# Patient Record
Sex: FEMALE | Race: WHITE | NOT HISPANIC OR LATINO | Employment: FULL TIME | URBAN - METROPOLITAN AREA
[De-identification: names, ages, dates, MRNs, and addresses within clinical notes are randomized per-mention and may not be internally consistent; named-entity substitution may affect disease eponyms.]

---

## 2021-03-26 DIAGNOSIS — Z23 ENCOUNTER FOR IMMUNIZATION: ICD-10-CM

## 2022-05-25 ENCOUNTER — APPOINTMENT (EMERGENCY)
Dept: RADIOLOGY | Facility: HOSPITAL | Age: 52
End: 2022-05-25
Payer: COMMERCIAL

## 2022-05-25 ENCOUNTER — APPOINTMENT (EMERGENCY)
Dept: NON INVASIVE DIAGNOSTICS | Facility: HOSPITAL | Age: 52
End: 2022-05-25
Payer: COMMERCIAL

## 2022-05-25 ENCOUNTER — HOSPITAL ENCOUNTER (EMERGENCY)
Facility: HOSPITAL | Age: 52
Discharge: HOME | End: 2022-05-25
Attending: EMERGENCY MEDICINE | Admitting: EMERGENCY MEDICINE
Payer: COMMERCIAL

## 2022-05-25 VITALS
TEMPERATURE: 98.5 F | SYSTOLIC BLOOD PRESSURE: 185 MMHG | RESPIRATION RATE: 17 BRPM | HEART RATE: 80 BPM | BODY MASS INDEX: 33.6 KG/M2 | WEIGHT: 201.94 LBS | OXYGEN SATURATION: 98 % | DIASTOLIC BLOOD PRESSURE: 89 MMHG

## 2022-05-25 DIAGNOSIS — M25.562 LEFT KNEE PAIN: Primary | ICD-10-CM

## 2022-05-25 PROCEDURE — 99282 EMERGENCY DEPT VISIT SF MDM: CPT

## 2022-05-25 PROCEDURE — 93971 EXTREMITY STUDY: CPT | Performed by: SURGERY

## 2022-05-25 PROCEDURE — 99284 EMERGENCY DEPT VISIT MOD MDM: CPT

## 2022-05-25 PROCEDURE — 93971 EXTREMITY STUDY: CPT

## 2022-05-25 PROCEDURE — 73564 X-RAY EXAM KNEE 4 OR MORE: CPT

## 2022-05-25 RX ORDER — BUPROPION HYDROCHLORIDE 300 MG/1
300 TABLET ORAL EVERY MORNING
COMMUNITY
Start: 2022-01-14 | End: 2023-01-14

## 2022-05-25 RX ORDER — ESCITALOPRAM OXALATE 20 MG/1
20 TABLET ORAL DAILY
COMMUNITY
Start: 2022-01-14 | End: 2023-01-14

## 2022-05-25 NOTE — ED PROVIDER NOTES
History  Chief Complaint   Patient presents with    Knee Pain     L knee pain x 1 month, reports lump behind knee this katarinag     54 YOF with PMH thyroid nodule, prediabetes presents with complaint of left knee pain x1 month  Pt reports she has been having pain behind her knee and today she woke up with a "lump" behind her knee  Prior to this she states that her knee would randomly give out which has been occurring for about 2 months  She also reports that she will experience a shooting pain in the back of her knee as sometimes radiates down to her calf when she is just sitting there  She also admits to sick pain with ambulation  Denies numbness, tingling, weakness, redness, warmth  Denies any injury or trauma to the area  Does not have a history of DVTs  Prior to Admission Medications   Prescriptions Last Dose Informant Patient Reported? Taking? buPROPion (WELLBUTRIN XL) 300 mg 24 hr tablet   Yes Yes   Sig: Take 300 mg by mouth every morning   escitalopram (LEXAPRO) 20 mg tablet   Yes Yes   Sig: Take 20 mg by mouth in the morning  Facility-Administered Medications: None       No past medical history on file  No past surgical history on file  No family history on file  I have reviewed and agree with the history as documented  No existing history information found  No existing history information found  Review of Systems   Constitutional: Negative for chills and fever  HENT: Negative for congestion, ear pain and sore throat  Eyes: Negative for pain and visual disturbance  Respiratory: Negative for cough and shortness of breath  Cardiovascular: Negative for chest pain and palpitations  Gastrointestinal: Negative for abdominal pain, diarrhea, nausea and vomiting  Genitourinary: Negative for decreased urine volume, dysuria and hematuria  Musculoskeletal: Positive for arthralgias (left kne)  Negative for back pain, neck pain and neck stiffness     Skin: Negative for color change and rash  Neurological: Negative for dizziness, seizures, syncope, weakness, light-headedness and headaches  All other systems reviewed and are negative  Physical Exam  Physical Exam  Vitals and nursing note reviewed  Constitutional:       General: She is not in acute distress  Appearance: She is well-developed  HENT:      Head: Normocephalic and atraumatic  Nose: Nose normal       Mouth/Throat:      Mouth: Mucous membranes are moist    Eyes:      Extraocular Movements: Extraocular movements intact  Conjunctiva/sclera: Conjunctivae normal    Cardiovascular:      Rate and Rhythm: Normal rate and regular rhythm  Heart sounds: No murmur heard  Pulmonary:      Effort: Pulmonary effort is normal  No respiratory distress  Breath sounds: Normal breath sounds  Abdominal:      General: Abdomen is flat  Palpations: Abdomen is soft  Tenderness: There is no abdominal tenderness  Musculoskeletal:         General: No swelling, deformity or signs of injury  Normal range of motion  Cervical back: Neck supple  Right knee: Normal       Left knee: Effusion present  No swelling, deformity, erythema, ecchymosis or lacerations  Normal range of motion  Tenderness present  Right lower leg: No swelling  No edema  Left lower leg: No swelling or tenderness  No edema  Comments: Possible effusion behind left knee  Not cyst-like  No redness or warmth  Slight pain to palpation   Skin:     General: Skin is warm and dry  Capillary Refill: Capillary refill takes less than 2 seconds  Neurological:      General: No focal deficit present  Mental Status: She is alert and oriented to person, place, and time     Psychiatric:         Mood and Affect: Mood normal          Behavior: Behavior normal          Vital Signs  ED Triage Vitals [05/25/22 0708]   Temperature Pulse Respirations Blood Pressure SpO2   98 5 °F (36 9 °C) 80 17 (!) 185/89 98 %      Temp Source Heart Rate Source Patient Position - Orthostatic VS BP Location FiO2 (%)   Oral Monitor Lying Left arm --      Pain Score       --           Vitals:    05/25/22 0708   BP: (!) 185/89   Pulse: 80   Patient Position - Orthostatic VS: Lying         Visual Acuity      ED Medications  Medications - No data to display    Diagnostic Studies  Results Reviewed     None                 XR knee 4+ views left injury   ED Interpretation by Zay Mcdowell PA-C (05/25 8592)   No acute bony findings, possible soft tissue swelling in posterior leg  Final Result by Marsha Thompson MD (60/68 2199)      No acute osseous abnormality  Workstation performed: [id number]         VAS lower limb venous duplex study, unilateral/limited    (Results Pending)              Procedures  Procedures         ED Course  ED Course as of 05/25/22 1121   Wed May 25, 2022   0841 XR knee 4+ views left injury  No acute osseous abnormality   0919 Negative for DVT in left lower extremity                               SBIRT 22yo+    Flowsheet Row Most Recent Value   SBIRT (25 yo +)    In order to provide better care to our patients, we are screening all of our patients for alcohol and drug use  Would it be okay to ask you these screening questions? Unable to answer at this time Filed at: 05/25/2022 0715                    [facility]  Number of Diagnoses or Management Options  Left knee pain  Diagnosis management comments: 54 YOF with PMH thyroid nodule, prediabetes presents with complaint of left knee pain x1 month  Physical exam as noted above  Xray showed no acute abnormalities  Venous duplex was also performed- negative for DVT  Suspect this is a developing cyst vs swelling of soft tissue  Recommend she use brace she has at home for compression and support as well as ice as needed  Referral placed for ortho  I have discussed with the patient our plan to discharge them from the ED and the patient is in agreement with this plan   The patient was provided a written after visit summary with strict RTED precautions  Amount and/or Complexity of Data Reviewed  Tests in the radiology section of CPT®: ordered and reviewed  Decide to obtain previous medical records or to obtain history from someone other than the patient: yes  Review and summarize past medical records: yes  Independent visualization of images, tracings, or specimens: yes    Patient Progress  Patient progress: stable      Disposition  Final diagnoses:   Left knee pain     Time reflects when diagnosis was documented in both MDM as applicable and the Disposition within this note     Time User Action Codes Description Comment    5/25/2022  8:26 AM Reynaldo Marin Add [C42 788] Left knee pain       ED Disposition     ED Disposition   Discharge    Condition   Stable    Date/Time   Wed May 25, 2022  9:20 AM    Comment   Elana Hope discharge to home/self care                 Follow-up Information     Follow up With Specialties Details Why Contact Info Additional Information    [address] [facility]  As needed [address] [facility] [address]  50 [facility] Emergency Department Emergency Medicine  If symptoms worsen [facility] [id number]  112 [facility] Emergency Department, [address] [place] [address] 200   As needed [phone]             Discharge Medication List as of 5/25/2022  9:20 AM      CONTINUE these medications which have NOT CHANGED    Details   buPROPion (WELLBUTRIN XL) 300 mg 24 hr tablet Take 300 mg by mouth every morning, Starting Fri 1/14/2022, Until Sat 1/14/2023, Historical Med      escitalopram (LEXAPRO) 20 mg tablet Take 20 mg by mouth in the morning , Starting Fri 1/14/2022, Until Sat 1/14/2023, Historical Med                 PDMP Review     None          ED Provider  Electronically Signed by           Sean Salas PA-C  05/25/22 7568

## 2022-08-19 ENCOUNTER — HOSPITAL ENCOUNTER (OUTPATIENT)
Facility: HOSPITAL | Age: 52
Setting detail: OBSERVATION
Discharge: HOME | End: 2022-08-21
Attending: EMERGENCY MEDICINE | Admitting: INTERNAL MEDICINE
Payer: COMMERCIAL

## 2022-08-19 DIAGNOSIS — K56.7 ILEUS (HCC): Primary | ICD-10-CM

## 2022-08-19 DIAGNOSIS — R10.84 GENERALIZED ABDOMINAL PAIN: ICD-10-CM

## 2022-08-19 LAB
BASOPHILS # BLD AUTO: 0.09 THOUSANDS/ΜL (ref 0–0.1)
BASOPHILS NFR BLD AUTO: 1 % (ref 0–1)
BILIRUB UR QL STRIP: NEGATIVE
CLARITY UR: CLEAR
CLARITY, POC: CLEAR
COLOR UR: YELLOW
COLOR, POC: NORMAL
EOSINOPHIL # BLD AUTO: 0.26 THOUSAND/ΜL (ref 0–0.61)
EOSINOPHIL NFR BLD AUTO: 2 % (ref 0–6)
ERYTHROCYTE [DISTWIDTH] IN BLOOD BY AUTOMATED COUNT: 12.9 % (ref 11.6–15.1)
EXT BILIRUBIN, UA: NEGATIVE
EXT BLOOD URINE: NEGATIVE
EXT GLUCOSE, UA: NEGATIVE
EXT KETONES: NEGATIVE
EXT NITRITE, UA: NEGATIVE
EXT PH, UA: 6
EXT PREG TEST URINE: NEGATIVE
EXT PROTEIN, UA: NEGATIVE
EXT SPECIFIC GRAVITY, UA: 1.02
EXT. CONTROL ED NAV: NORMAL
GLUCOSE UR STRIP-MCNC: NEGATIVE MG/DL
HCT VFR BLD AUTO: 44.5 % (ref 34.8–46.1)
HGB BLD-MCNC: 15.5 G/DL (ref 11.5–15.4)
HGB UR QL STRIP.AUTO: NEGATIVE
IMM GRANULOCYTES # BLD AUTO: 0.08 THOUSAND/UL (ref 0–0.2)
IMM GRANULOCYTES NFR BLD AUTO: 1 % (ref 0–2)
KETONES UR STRIP-MCNC: NEGATIVE MG/DL
LEUKOCYTE ESTERASE UR QL STRIP: NEGATIVE
LYMPHOCYTES # BLD AUTO: 3.5 THOUSANDS/ΜL (ref 0.6–4.47)
LYMPHOCYTES NFR BLD AUTO: 32 % (ref 14–44)
MCH RBC QN AUTO: 29.2 PG (ref 26.8–34.3)
MCHC RBC AUTO-ENTMCNC: 34.8 G/DL (ref 31.4–37.4)
MCV RBC AUTO: 84 FL (ref 82–98)
MONOCYTES # BLD AUTO: 0.77 THOUSAND/ΜL (ref 0.17–1.22)
MONOCYTES NFR BLD AUTO: 7 % (ref 4–12)
NEUTROPHILS # BLD AUTO: 6.38 THOUSANDS/ΜL (ref 1.85–7.62)
NEUTS SEG NFR BLD AUTO: 57 % (ref 43–75)
NITRITE UR QL STRIP: NEGATIVE
NRBC BLD AUTO-RTO: 0 /100 WBCS
PH UR STRIP.AUTO: 6 [PH] (ref 4.5–8)
PLATELET # BLD AUTO: 255 THOUSANDS/UL (ref 149–390)
PMV BLD AUTO: 9.6 FL (ref 8.9–12.7)
PROT UR STRIP-MCNC: NEGATIVE MG/DL
RBC # BLD AUTO: 5.3 MILLION/UL (ref 3.81–5.12)
SP GR UR STRIP.AUTO: 1.02 (ref 1–1.03)
UROBILINOGEN UR QL STRIP.AUTO: 0.2 E.U./DL
WBC # BLD AUTO: 11.08 THOUSAND/UL (ref 4.31–10.16)
WBC # BLD EST: NEGATIVE 10*3/UL

## 2022-08-19 PROCEDURE — 81025 URINE PREGNANCY TEST: CPT | Performed by: EMERGENCY MEDICINE

## 2022-08-19 PROCEDURE — 85610 PROTHROMBIN TIME: CPT | Performed by: EMERGENCY MEDICINE

## 2022-08-19 PROCEDURE — 85730 THROMBOPLASTIN TIME PARTIAL: CPT | Performed by: EMERGENCY MEDICINE

## 2022-08-19 PROCEDURE — 96375 TX/PRO/DX INJ NEW DRUG ADDON: CPT

## 2022-08-19 PROCEDURE — 81003 URINALYSIS AUTO W/O SCOPE: CPT

## 2022-08-19 PROCEDURE — 85025 COMPLETE CBC W/AUTO DIFF WBC: CPT | Performed by: EMERGENCY MEDICINE

## 2022-08-19 PROCEDURE — 83690 ASSAY OF LIPASE: CPT | Performed by: EMERGENCY MEDICINE

## 2022-08-19 PROCEDURE — 99285 EMERGENCY DEPT VISIT HI MDM: CPT

## 2022-08-19 PROCEDURE — 99285 EMERGENCY DEPT VISIT HI MDM: CPT | Performed by: EMERGENCY MEDICINE

## 2022-08-19 PROCEDURE — 80053 COMPREHEN METABOLIC PANEL: CPT | Performed by: EMERGENCY MEDICINE

## 2022-08-19 PROCEDURE — 96361 HYDRATE IV INFUSION ADD-ON: CPT

## 2022-08-19 PROCEDURE — 96374 THER/PROPH/DIAG INJ IV PUSH: CPT

## 2022-08-19 PROCEDURE — 36415 COLL VENOUS BLD VENIPUNCTURE: CPT | Performed by: EMERGENCY MEDICINE

## 2022-08-19 RX ORDER — ONDANSETRON 2 MG/ML
4 INJECTION INTRAMUSCULAR; INTRAVENOUS ONCE
Status: COMPLETED | OUTPATIENT
Start: 2022-08-19 | End: 2022-08-19

## 2022-08-19 RX ORDER — MORPHINE SULFATE 4 MG/ML
6 INJECTION, SOLUTION INTRAMUSCULAR; INTRAVENOUS ONCE
Status: COMPLETED | OUTPATIENT
Start: 2022-08-19 | End: 2022-08-19

## 2022-08-19 RX ADMIN — SODIUM CHLORIDE 1000 ML: 0.9 INJECTION, SOLUTION INTRAVENOUS at 23:49

## 2022-08-19 RX ADMIN — ONDANSETRON 4 MG: 2 INJECTION INTRAMUSCULAR; INTRAVENOUS at 23:49

## 2022-08-19 RX ADMIN — MORPHINE SULFATE 6 MG: 4 INJECTION INTRAVENOUS at 23:58

## 2022-08-20 ENCOUNTER — APPOINTMENT (EMERGENCY)
Dept: CT IMAGING | Facility: HOSPITAL | Age: 52
End: 2022-08-20
Payer: COMMERCIAL

## 2022-08-20 PROBLEM — E86.0 DEHYDRATION: Status: ACTIVE | Noted: 2022-08-20

## 2022-08-20 PROBLEM — F34.1 DYSTHYMIA: Status: ACTIVE | Noted: 2022-08-20

## 2022-08-20 PROBLEM — K56.7 ILEUS (HCC): Status: ACTIVE | Noted: 2022-08-20

## 2022-08-20 LAB
ALBUMIN SERPL BCP-MCNC: 3.9 G/DL (ref 3.5–5)
ALP SERPL-CCNC: 60 U/L (ref 46–116)
ALT SERPL W P-5'-P-CCNC: 64 U/L (ref 12–78)
ANION GAP SERPL CALCULATED.3IONS-SCNC: 9 MMOL/L (ref 4–13)
APTT PPP: 22 SECONDS (ref 23–37)
AST SERPL W P-5'-P-CCNC: 30 U/L (ref 5–45)
BILIRUB SERPL-MCNC: 0.4 MG/DL (ref 0.2–1)
BUN SERPL-MCNC: 13 MG/DL (ref 5–25)
CALCIUM SERPL-MCNC: 9.2 MG/DL (ref 8.3–10.1)
CHLORIDE SERPL-SCNC: 103 MMOL/L (ref 96–108)
CO2 SERPL-SCNC: 27 MMOL/L (ref 21–32)
CREAT SERPL-MCNC: 1.14 MG/DL (ref 0.6–1.3)
GFR SERPL CREATININE-BSD FRML MDRD: 56 ML/MIN/1.73SQ M
GLUCOSE SERPL-MCNC: 118 MG/DL (ref 65–140)
INR PPP: 0.95 (ref 0.84–1.19)
LACTATE SERPL-SCNC: 0.9 MMOL/L (ref 0.5–2)
LIPASE SERPL-CCNC: 112 U/L (ref 73–393)
POTASSIUM SERPL-SCNC: 3.8 MMOL/L (ref 3.5–5.3)
PROT SERPL-MCNC: 8.4 G/DL (ref 6.4–8.4)
PROTHROMBIN TIME: 12.7 SECONDS (ref 11.6–14.5)
SODIUM SERPL-SCNC: 139 MMOL/L (ref 135–147)

## 2022-08-20 PROCEDURE — 74176 CT ABD & PELVIS W/O CONTRAST: CPT

## 2022-08-20 PROCEDURE — 96376 TX/PRO/DX INJ SAME DRUG ADON: CPT

## 2022-08-20 PROCEDURE — G1004 CDSM NDSC: HCPCS

## 2022-08-20 PROCEDURE — 36415 COLL VENOUS BLD VENIPUNCTURE: CPT | Performed by: EMERGENCY MEDICINE

## 2022-08-20 PROCEDURE — 83605 ASSAY OF LACTIC ACID: CPT | Performed by: EMERGENCY MEDICINE

## 2022-08-20 PROCEDURE — 99220 PR INITIAL OBSERVATION CARE/DAY 70 MINUTES: CPT | Performed by: INTERNAL MEDICINE

## 2022-08-20 PROCEDURE — 74177 CT ABD & PELVIS W/CONTRAST: CPT

## 2022-08-20 PROCEDURE — 99204 OFFICE O/P NEW MOD 45 MIN: CPT | Performed by: SURGERY

## 2022-08-20 RX ORDER — MORPHINE SULFATE 4 MG/ML
4 INJECTION, SOLUTION INTRAMUSCULAR; INTRAVENOUS ONCE
Status: COMPLETED | OUTPATIENT
Start: 2022-08-20 | End: 2022-08-20

## 2022-08-20 RX ORDER — ACETAMINOPHEN 325 MG/1
650 TABLET ORAL EVERY 4 HOURS PRN
Status: DISCONTINUED | OUTPATIENT
Start: 2022-08-20 | End: 2022-08-21 | Stop reason: HOSPADM

## 2022-08-20 RX ORDER — ESCITALOPRAM OXALATE 20 MG/1
20 TABLET ORAL DAILY
Status: DISCONTINUED | OUTPATIENT
Start: 2022-08-20 | End: 2022-08-21 | Stop reason: HOSPADM

## 2022-08-20 RX ORDER — BUPROPION HYDROCHLORIDE 150 MG/1
300 TABLET ORAL EVERY MORNING
Status: DISCONTINUED | OUTPATIENT
Start: 2022-08-20 | End: 2022-08-21 | Stop reason: HOSPADM

## 2022-08-20 RX ORDER — ONDANSETRON 2 MG/ML
4 INJECTION INTRAMUSCULAR; INTRAVENOUS EVERY 4 HOURS PRN
Status: DISCONTINUED | OUTPATIENT
Start: 2022-08-20 | End: 2022-08-21 | Stop reason: HOSPADM

## 2022-08-20 RX ORDER — ONDANSETRON 2 MG/ML
4 INJECTION INTRAMUSCULAR; INTRAVENOUS ONCE
Status: COMPLETED | OUTPATIENT
Start: 2022-08-20 | End: 2022-08-20

## 2022-08-20 RX ORDER — SODIUM CHLORIDE AND POTASSIUM CHLORIDE .9; .15 G/100ML; G/100ML
125 SOLUTION INTRAVENOUS CONTINUOUS
Status: DISCONTINUED | OUTPATIENT
Start: 2022-08-20 | End: 2022-08-21

## 2022-08-20 RX ADMIN — ONDANSETRON 4 MG: 2 INJECTION INTRAMUSCULAR; INTRAVENOUS at 05:25

## 2022-08-20 RX ADMIN — ACETAMINOPHEN 650 MG: 325 TABLET, FILM COATED ORAL at 16:07

## 2022-08-20 RX ADMIN — IOHEXOL 60 ML: 350 INJECTION, SOLUTION INTRAVENOUS at 00:38

## 2022-08-20 RX ADMIN — ESCITALOPRAM OXALATE 20 MG: 20 TABLET, FILM COATED ORAL at 09:36

## 2022-08-20 RX ADMIN — BUPROPION HYDROCHLORIDE 300 MG: 150 TABLET, FILM COATED, EXTENDED RELEASE ORAL at 09:36

## 2022-08-20 RX ADMIN — SODIUM CHLORIDE AND POTASSIUM CHLORIDE 125 ML/HR: .9; .15 SOLUTION INTRAVENOUS at 20:55

## 2022-08-20 RX ADMIN — MORPHINE SULFATE 4 MG: 4 INJECTION INTRAVENOUS at 05:25

## 2022-08-20 RX ADMIN — DIATRIZOATE MEGLUMINE AND DIATRIZOATE SODIUM 30 ML: 660; 100 LIQUID ORAL; RECTAL at 05:58

## 2022-08-20 RX ADMIN — ACETAMINOPHEN 650 MG: 325 TABLET, FILM COATED ORAL at 09:36

## 2022-08-20 RX ADMIN — SODIUM CHLORIDE AND POTASSIUM CHLORIDE 125 ML/HR: .9; .15 SOLUTION INTRAVENOUS at 11:04

## 2022-08-20 NOTE — ED PROVIDER NOTES
History  Chief Complaint   Patient presents with    Abdominal Pain     Pt c/o abd pain since 530pm, reports pain has been getting worse, also reports nausea  This is a 57-year-old female who presents with abdominal pain  At around 5:30 p m  This evening, patient started to experience a generalized abdominal pain described as cramping, constant, waxing and waning in intensity, associated with 1 episode of nonbloody, nonbilious vomiting  She has never experienced this type of pain before  Denies any alleviating or exacerbating factors  She has not taken anything for the pain  Her last bowel movement was yesterday described as normal   Denies any history of abdominal surgeries  No history of gallstones, kidney stones, alcohol abuse  Denies fever/chills, lightheadedness/dizziness, numbness/weakness, headache, change in vision, URI symptoms, neck pain, chest pain, palpitations, shortness of breath, cough, back pain, flank pain, diarrhea, hematochezia, melena, dysuria, hematuria, abnormal vaginal discharge/bleeding  Prior to Admission Medications   Prescriptions Last Dose Informant Patient Reported? Taking? buPROPion (WELLBUTRIN XL) 300 mg 24 hr tablet 8/19/2022 at Unknown time  Yes Yes   Sig: Take 300 mg by mouth every morning   escitalopram (LEXAPRO) 20 mg tablet 8/19/2022 at Unknown time  Yes Yes   Sig: Take 20 mg by mouth in the morning  Facility-Administered Medications: None       History reviewed  No pertinent past medical history  History reviewed  No pertinent surgical history  History reviewed  No pertinent family history  I have reviewed and agree with the history as documented      E-Cigarette/Vaping     E-Cigarette/Vaping Substances     Social History     Tobacco Use    Smoking status: Current Every Day Smoker     Packs/day: 0 50     Types: Cigarettes     Start date: 14    Smokeless tobacco: Never Used   Substance Use Topics    Alcohol use: Yes     Comment: milvia    Drug use: Never       Review of Systems   Constitutional: Negative for chills and fever  HENT: Negative for rhinorrhea, sore throat and trouble swallowing  Eyes: Negative for photophobia and visual disturbance  Respiratory: Negative for cough, chest tightness and shortness of breath  Cardiovascular: Negative for chest pain, palpitations and leg swelling  Gastrointestinal: Positive for abdominal pain, nausea and vomiting  Negative for blood in stool and diarrhea  Endocrine: Negative for polyuria  Genitourinary: Negative for dysuria, flank pain, hematuria, vaginal bleeding and vaginal discharge  Musculoskeletal: Negative for back pain and neck pain  Skin: Negative for color change and rash  Allergic/Immunologic: Negative for immunocompromised state  Neurological: Negative for dizziness, weakness, light-headedness, numbness and headaches  All other systems reviewed and are negative  Physical Exam  Physical Exam  Constitutional:       General: She is not in acute distress  Appearance: Normal appearance  She is well-developed  HENT:      Mouth/Throat:      Pharynx: Uvula midline  Eyes:      Conjunctiva/sclera: Conjunctivae normal       Pupils: Pupils are equal, round, and reactive to light  Neck:      Thyroid: No thyroid mass or thyromegaly  Trachea: Trachea normal    Cardiovascular:      Rate and Rhythm: Normal rate and regular rhythm  Heart sounds: Normal heart sounds  No murmur heard  Pulmonary:      Effort: Pulmonary effort is normal       Breath sounds: Normal breath sounds  Abdominal:      General: Bowel sounds are normal       Palpations: Abdomen is soft  Tenderness: There is abdominal tenderness in the right upper quadrant, right lower quadrant and epigastric area  There is no guarding or rebound  Skin:     General: Skin is warm and dry  Neurological:      Mental Status: She is alert     Psychiatric:         Speech: Speech normal          Behavior: Behavior normal  Behavior is cooperative  Thought Content:  Thought content normal          Vital Signs  ED Triage Vitals   Temperature Pulse Respirations Blood Pressure SpO2   08/19/22 2313 08/19/22 2313 08/19/22 2313 08/19/22 2313 08/19/22 2313   97 8 °F (36 6 °C) 76 16 (!) 181/79 99 %      Temp Source Heart Rate Source Patient Position - Orthostatic VS BP Location FiO2 (%)   08/19/22 2313 08/19/22 2313 08/19/22 2313 08/19/22 2313 --   Oral Monitor Sitting Right arm       Pain Score       08/19/22 2315       10 - Worst Possible Pain           Vitals:    08/20/22 1515 08/20/22 2318 08/21/22 0625 08/21/22 1411   BP: 131/79 132/81 127/83 132/78   Pulse: 68 73 67 69   Patient Position - Orthostatic VS:  Lying Lying          Visual Acuity      ED Medications  Medications   sodium chloride 0 9 % bolus 1,000 mL (0 mL Intravenous Stopped 8/20/22 0054)   ondansetron (ZOFRAN) injection 4 mg (4 mg Intravenous Given 8/19/22 2349)   morphine injection 6 mg (6 mg Intravenous Given 8/19/22 2358)   iohexol (OMNIPAQUE) 350 MG/ML injection (SINGLE-DOSE) 60 mL (60 mL Intravenous Given 8/20/22 0038)   ondansetron (ZOFRAN) injection 4 mg (4 mg Intravenous Given 8/20/22 0525)   morphine injection 4 mg (4 mg Intravenous Given 8/20/22 0525)   diatrizoate meglumine-sodium (GASTROGRAFIN) solution 30 mL (30 mL Oral Given 8/20/22 0558)       Diagnostic Studies  Results Reviewed     Procedure Component Value Units Date/Time    Basic metabolic panel [[id number]] Collected: 08/21/22 0447    Lab Status: Final result Specimen: Blood from Arm, Right Updated: 08/21/22 0515     Sodium 141 mmol/L      Potassium 4 3 mmol/L      Chloride 108 mmol/L      CO2 28 mmol/L      ANION GAP 5 mmol/L      BUN 8 mg/dL      Creatinine 1 07 mg/dL      Glucose 99 mg/dL      Calcium 8 4 mg/dL      eGFR 61 ml/min/1 73sq m     Narrative:      [place] guidelines for Chronic Kidney Disease (CKD):     Stage 1 with normal or high GFR (GFR > 90 mL/min/1 73 square meters)    Stage 2 Mild CKD (GFR = 60-89 mL/min/1 73 square meters)    Stage 3A Moderate CKD (GFR = 45-59 mL/min/1 73 square meters)    Stage 3B Moderate CKD (GFR = 30-44 mL/min/1 73 square meters)    Stage 4 Severe CKD (GFR = 15-29 mL/min/1 73 square meters)    Stage 5 End Stage CKD (GFR <15 mL/min/1 73 square meters)  Note: GFR calculation is accurate only with a steady state creatinine    CBC and differential [[id number]] Collected: 08/21/22 0448    Lab Status: Final result Specimen: Blood from Arm, Right Updated: 08/21/22 0507     WBC 6 05 Thousand/uL      RBC 4 75 Million/uL      Hemoglobin 13 7 g/dL      Hematocrit 40 9 %      MCV 86 fL      MCH 28 8 pg      MCHC 33 5 g/dL      RDW 12 9 %      MPV 9 7 fL      Platelets 465 Thousands/uL      nRBC 0 /100 WBCs      Neutrophils Relative 49 %      Immat GRANS % 0 %      Lymphocytes Relative 40 %      Monocytes Relative 7 %      Eosinophils Relative 3 %      Basophils Relative 1 %      Neutrophils Absolute 2 96 Thousands/µL      Immature Grans Absolute 0 02 Thousand/uL      Lymphocytes Absolute 2 40 Thousands/µL      Monocytes Absolute 0 44 Thousand/µL      Eosinophils Absolute 0 19 Thousand/µL      Basophils Absolute 0 04 Thousands/µL     POCT urinalysis dipstick [[id number]]  (Normal) Resulted: 08/19/22 7533    Lab Status: Final result Specimen: Urine Updated: 08/20/22 0645     Color, UA yellow/straw     Clarity, UA clear     Glucose, UA (Ref: Negative) negative     Bilirubin, UA (Ref: Negative) negative     Ketones, UA (Ref: Negative) negative     Spec Grav, UA (Ref:1 003-1 030) 1 020     Blood, UA (Ref: Negative) negative     pH, UA (Ref: 4 5-8 0) 6 0     Protein, UA (Ref: Negative) negative      Leukocytes, UA (Ref: Negative) negative     Nitrite, UA (Ref: Negative) negative    Lactic acid [[id number]]  (Normal) Collected: 08/20/22 0313    Lab Status: Final result Specimen: Blood from Arm, Left Updated: 08/20/22 0338     LACTIC ACID 0 9 mmol/L     Narrative:      Result may be elevated if tourniquet was used during collection      Comprehensive metabolic panel [[id number]] Collected: 08/19/22 2349    Lab Status: Final result Specimen: Blood from Arm, Left Updated: 08/20/22 0016     Sodium 139 mmol/L      Potassium 3 8 mmol/L      Chloride 103 mmol/L      CO2 27 mmol/L      ANION GAP 9 mmol/L      BUN 13 mg/dL      Creatinine 1 14 mg/dL      Glucose 118 mg/dL      Calcium 9 2 mg/dL      AST 30 U/L      ALT 64 U/L      Alkaline Phosphatase 60 U/L      Total Protein 8 4 g/dL      Albumin 3 9 g/dL      Total Bilirubin 0 40 mg/dL      eGFR 56 ml/min/1 73sq m     Narrative:      [place] guidelines for Chronic Kidney Disease (CKD):     Stage 1 with normal or high GFR (GFR > 90 mL/min/1 73 square meters)    Stage 2 Mild CKD (GFR = 60-89 mL/min/1 73 square meters)    Stage 3A Moderate CKD (GFR = 45-59 mL/min/1 73 square meters)    Stage 3B Moderate CKD (GFR = 30-44 mL/min/1 73 square meters)    Stage 4 Severe CKD (GFR = 15-29 mL/min/1 73 square meters)    Stage 5 End Stage CKD (GFR <15 mL/min/1 73 square meters)  Note: GFR calculation is accurate only with a steady state creatinine    Lipase [[id number]]  (Normal) Collected: 08/19/22 2349    Lab Status: Final result Specimen: Blood from Arm, Left Updated: 08/20/22 0016     Lipase 112 u/L     Protime-INR [[id number]]  (Normal) Collected: 08/19/22 2349    Lab Status: Final result Specimen: Blood from Arm, Left Updated: 08/20/22 0006     Protime 12 7 seconds      INR 0 95    APTT [[id number]]  (Abnormal) Collected: 08/19/22 2349    Lab Status: Final result Specimen: Blood from Arm, Left Updated: 08/20/22 0006     PTT 22 seconds     CBC and differential [[id number]]  (Abnormal) Collected: 08/19/22 2349    Lab Status: Final result Specimen: Blood from Arm, Left Updated: 08/19/22 2359     WBC 11 08 Thousand/uL      RBC 5 30 Million/uL      Hemoglobin 15 5 g/dL      Hematocrit 44 5 % MCV 84 fL      MCH 29 2 pg      MCHC 34 8 g/dL      RDW 12 9 %      MPV 9 6 fL      Platelets 581 Thousands/uL      nRBC 0 /100 WBCs      Neutrophils Relative 57 %      Immat GRANS % 1 %      Lymphocytes Relative 32 %      Monocytes Relative 7 %      Eosinophils Relative 2 %      Basophils Relative 1 %      Neutrophils Absolute 6 38 Thousands/µL      Immature Grans Absolute 0 08 Thousand/uL      Lymphocytes Absolute 3 50 Thousands/µL      Monocytes Absolute 0 77 Thousand/µL      Eosinophils Absolute 0 26 Thousand/µL      Basophils Absolute 0 09 Thousands/µL     POCT pregnancy, urine [[id number]]  (Normal) Resulted: 08/19/22 2352    Lab Status: Final result Updated: 08/19/22 2352     EXT PREG TEST UR (Ref: Negative) NEGATIVE     Control VALID    Urine Macroscopic, POC [[id number]] Collected: 08/19/22 2347    Lab Status: Final result Specimen: Urine Updated: 08/19/22 2348     Color, UA Yellow     Clarity, UA Clear     pH, UA 6 0     Leukocytes, UA Negative     Nitrite, UA Negative     Protein, UA Negative mg/dl      Glucose, UA Negative mg/dl      Ketones, UA Negative mg/dl      Urobilinogen, UA 0 2 E U /dl      Bilirubin, UA Negative     Occult Blood, UA Negative     Specific Gravity, UA 1 020    Narrative:      CLINITEK RESULT                 CT abdomen pelvis wo contrast   Final Result by Kayley Trent MD (08/20 0636)      Fairly long segment of mildly but disproportionately distended jejunum in the left mid abdomen is of uncertain etiology  Even though enteric contrast has not passed into the more collapsed distal small bowel, in this patient with no history of prior    abdominal surgery the appearance remains more suspicious for localized ileus than for small bowel obstruction  If appropriate to the clinical scenario, continued interval radiographic follow-up to observe the progression of ingested enteric contrast    would likely prove useful  Hepatic steatosis              Workstation performed: [id number]         CT abdomen pelvis with contrast   Final Result by Cristina Sharpe MD (08/20 0234)      Multiple dilated fluid-filled small bowel loops with transition point in the left upper quadrant compatible with small bowel obstruction  The study was marked in [facility] for immediate notification  Workstation performed: [id number]                    Procedures  Procedures         ED Course  ED Course as of 08/23/22 2059   Sat Aug 20, 2022   0258 TT sent to surgery resident at [id number] Surgery resident has seen the patient  Requesting a repeat CT with oral contrast                                              MDM  Number of Diagnoses or Management Options  Diagnosis management comments: Will check labs, urinalysis  CT abdomen/pelvis with contrast   IV fluids and analgesia  Disposition pending results  Disposition  Final diagnoses:   Ileus ([address] )   Generalized abdominal pain     Time reflects when diagnosis was documented in both MDM as applicable and the Disposition within this note     Time User Action Codes Description Comment    8/20/2022  2:45 AM Dave Tapia Add [K56 [id number]] SBO (small bowel obstruction) ([facility] [address] )     8/20/2022  8:25 AM Rudell Senna A Remove [K56 609] SBO (small bowel obstruction) ([address] )     8/20/2022  8:25 AM Rudell Senna A Add [K56 7] Ileus ([address] )     8/20/2022  8:26 AM Rudell Senna A Add [R10 84] Generalized abdominal pain       ED Disposition     ED Disposition   Admit    Condition   Good    Date/Time   Sat Aug 20, 2022  8:26 AM    Comment   Case was discussed with dr barrios and the patient's admission status was agreed to be Admission Status: observation status to the service of Dr Jose Luis Horan              Follow-up Information     Follow up With Specialties Details Why Contact Info    [facility] Schedule an appointment as soon as possible for a visit in 1 week(s)  [address] [facility]  [phone]            Discharge Medication List as of 8/21/2022  3:42 PM      START taking these medications    Details   ondansetron (Zofran ODT) 4 mg disintegrating tablet Take 1 tablet (4 mg total) by mouth every 6 (six) hours as needed for nausea or vomiting, Starting Sun 8/21/2022, Normal         CONTINUE these medications which have NOT CHANGED    Details   buPROPion (WELLBUTRIN XL) 300 mg 24 hr tablet Take 300 mg by mouth every morning, Starting Fri 1/14/2022, Until Sat 1/14/2023, Historical Med      escitalopram (LEXAPRO) 20 mg tablet Take 20 mg by mouth in the morning , Starting Fri 1/14/2022, Until Sat 1/14/2023, Historical Med             Outpatient Discharge Orders   Discharge Diet     Activity as tolerated       PDMP Review     None          ED Provider  Electronically Signed by           Luis Javier MD  08/23/22 2059

## 2022-08-20 NOTE — ED NOTES
Patient rang out reporting nausea and abdominal pain had returned after drinking contrast dye   MD made aware     Matthew Braswell RN  08/20/22 8148

## 2022-08-20 NOTE — H&P
Unit/Bed#: E5 -01 Encounter: [id number]    Chief complaint, abdominal pain and vomiting    History of Present Illness     HPI:  Gordan Barthel is a 54 y o  female who was in her usual state of good health until the evening before admission  At that time she developed some crampy abdominal pain  She developed vomiting  She had no fever  She had no diarrhea  She had no sign of GI hemorrhage  She was seen in the emergency room and a CT scan was done  This revealed the possibility of small-bowel obstruction  She was evaluated by the surgery house staff who a requested additional imaging  This suggested ileus rather than small bowel obstruction  Dr Melissa Tejada diagnosis as a medical problem and suggested that the emergency room staff contact Internal Medicine to arrange admission  At the time of my evaluation the patient was feeling somewhat better  She was still nauseous and not able to tolerate more than little fluid  She is admitted for further care  The patient's past medical history is positive for depression which is under good control  She has no history of sustained hypertension no her blood pressure was somewhat elevated at the time of her last outpatient visit  She says that her sugar has been borderline but she has no history of overt diabetes  She denies hypercholesterolemia, cardiac disease, COPD, peptic ulcer disease, kidney disease, etcetera  The patient has had no surgery  The patient's current medications include bupropion 300 mg daily and escitalopram 20 mg daily    The patient has a distant history of penicillin allergy  It sounds like this was GI side effects rather than a true allergy  Family history is noncontributory  Social history reveals the patient is  lives with her   She smokes 1/2 pack of cigarettes per day  She drinks ethanol sparingly  She uses no illicit drugs      Review of systems is taken in detail and is unrevealing except as mentioned above  Objective   Vitals: Blood pressure 131/79, pulse 68, temperature 97 8 °F (36 6 °C), temperature source Oral, resp  rate 16, weight 93 3 kg (205 lb 11 oz), last menstrual period 05/17/2022, SpO2 96 %  Physical Exam   The patient is a well-developed, well-nourished woman who appears in no acute distress  Head is atraumatic and normocephalic  ENT examination is normal   Eyes show the pupils to be equal, round, and reactive to light  Extraocular movements are intact  Neck is supple  Carotids are full without bruits  There is no lymphadenopathy or goiter  Lungs are clear to auscultation and percussion  There is no wheezing, rales, or rhonchi  Cardiac exam reveals a regular rhythm  I heard no murmur, gallop, or rub  The abdomen is soft  Bowel sounds are hypoactive  There is mild diffuse tenderness  There is no mass or rebound  The extremities showed no clubbing, cyanosis, or edema  No calf tenderness is present  Neurologic examination reveals the patient to be alert and oriented  No focal sign is noted      Lab Results:   Results for orders placed or performed during the hospital encounter of 08/19/22   CBC and differential   Result Value Ref Range    WBC 11 08 (H) 4 31 - 10 16 Thousand/uL    RBC 5 30 (H) 3 81 - 5 12 Million/uL    Hemoglobin 15 5 (H) 11 5 - 15 4 g/dL    Hematocrit 44 5 34 8 - 46 1 %    MCV 84 82 - 98 fL    MCH 29 2 26 8 - 34 3 pg    MCHC 34 8 31 4 - 37 4 g/dL    RDW 12 9 11 6 - 15 1 %    MPV 9 6 8 9 - 12 7 fL    Platelets 763 542 - 709 Thousands/uL    nRBC 0 /100 WBCs    Neutrophils Relative 57 43 - 75 %    Immat GRANS % 1 0 - 2 %    Lymphocytes Relative 32 14 - 44 %    Monocytes Relative 7 4 - 12 %    Eosinophils Relative 2 0 - 6 %    Basophils Relative 1 0 - 1 %    Neutrophils Absolute 6 38 1 85 - 7 62 Thousands/µL    Immature Grans Absolute 0 08 0 00 - 0 20 Thousand/uL    Lymphocytes Absolute 3 50 0 60 - 4 47 Thousands/µL    Monocytes Absolute 0 77 0 17 - 1 22 Thousand/µL    Eosinophils Absolute 0 26 0 00 - 0 61 Thousand/µL    Basophils Absolute 0 09 0 00 - 0 10 Thousands/µL   Protime-INR   Result Value Ref Range    Protime 12 7 11 6 - 14 5 seconds    INR 0 95 0 84 - 1 19   APTT   Result Value Ref Range    PTT 22 (L) 23 - 37 seconds   Comprehensive metabolic panel   Result Value Ref Range    Sodium 139 135 - 147 mmol/L    Potassium 3 8 3 5 - 5 3 mmol/L    Chloride 103 96 - 108 mmol/L    CO2 27 21 - 32 mmol/L    ANION GAP 9 4 - 13 mmol/L    BUN 13 5 - 25 mg/dL    Creatinine 1 14 0 60 - 1 30 mg/dL    Glucose 118 65 - 140 mg/dL    Calcium 9 2 8 3 - 10 1 mg/dL    AST 30 5 - 45 U/L    ALT 64 12 - 78 U/L    Alkaline Phosphatase 60 46 - 116 U/L    Total Protein 8 4 6 4 - 8 4 g/dL    Albumin 3 9 3 5 - 5 0 g/dL    Total Bilirubin 0 40 0 20 - 1 00 mg/dL    eGFR 56 ml/min/1 73sq m   Lipase   Result Value Ref Range    Lipase 112 73 - 393 u/L   Lactic acid   Result Value Ref Range    LACTIC ACID 0 9 0 5 - 2 0 mmol/L   POCT pregnancy, urine   Result Value Ref Range    EXT PREG TEST UR (Ref: Negative) NEGATIVE     Control VALID    POCT urinalysis dipstick   Result Value Ref Range    Color, UA yellow/straw     Clarity, UA clear     Glucose, UA (Ref: Negative) negative     Bilirubin, UA (Ref: Negative) negative     Ketones, UA (Ref: Negative) negative     Spec Grav, UA (Ref:1 003-1 030) 1 020     Blood, UA (Ref: Negative) negative     pH, UA (Ref: 4 5-8 0) 6 0     Protein, UA (Ref: Negative) negative      Leukocytes, UA (Ref: Negative) negative     Nitrite, UA (Ref: Negative) negative    Urine Macroscopic, POC   Result Value Ref Range    Color, UA Yellow     Clarity, UA Clear     pH, UA 6 0 4 5 - 8 0    Leukocytes, UA Negative Negative    Nitrite, UA Negative Negative    Protein, UA Negative Negative mg/dl    Glucose, UA Negative Negative mg/dl    Ketones, UA Negative Negative mg/dl    Urobilinogen, UA 0 2 0 2, 1 0 E U /dl E U /dl    Bilirubin, UA Negative Negative    Occult Blood, UA Negative Negative    Specific Gravity, UA 1 020 1 003 - 1 030               Assessment/Plan     Assessment:  1  Abdominal pain and vomiting, probable ileus  2  Dehydration  3  Depression     Plan:  The patient will be placed on observation  She will be vigorously hydrated with intravenous fluid  She will be treated with antiemetics and analgesics  I believe that the patient's symptoms are most likely related to an underlying viral illness or food poisoning  I suspect that she will recover with supportive care  I discussed resuscitative measures with the patient she would like all available measures employed on her behalf in the event of a cardiac arrest   She is a sign to code blue level 1  I am hopeful that the patient will require only 1 midnight of hospitalization  She is therefore assigned to observation status          Code Status: Level 1 - Full Code

## 2022-08-20 NOTE — CONSULTS
Consultation - General Surgery  Joan Howell 54 y o  female MRN: [id number]  Unit/Bed#: ED 20 Encounter: [id number]        Assessment/Plan     Assessment:  Joan Howell is a 54 y o  female with PMH of depression, obesity (BMI 34) and no past surgical history who presents with small bowel obstruction vs ileus vs gastroenteritis     afebrile  Hypertensive   on RA     PE: non tender    CT with oral contrast with no progression of contrast past collapsed distal small bowel, suspicious for ileus rather than SBO      Plan:  · Recommend slowly advance diet as tolerated, currently nauseas  · Consider KUB this afternoon to assess for progression of contrast    History of Present Illness     HPI:  Joan Howell is a 54 y o  female with above PMH who presents with acute onset periumbilical abdominal pain, nausea, and 1 episode of vomiting last night  Felt better after vomiting  Original CT concerning for SBO with transition point  Repeat as seen above  On repeat evaluation this morning patient still have some subjective pain  Some nausea no vomiting  Non tender on exam      Review of Systems   Constitutional: Negative for chills and fever  HENT: Negative for ear pain and sore throat  Eyes: Negative for pain and visual disturbance  Respiratory: Negative for cough and shortness of breath  Cardiovascular: Negative for chest pain and palpitations  Gastrointestinal: Positive for abdominal pain, nausea and vomiting  Genitourinary: Negative for dysuria and hematuria  Musculoskeletal: Negative for arthralgias and back pain  Skin: Negative for color change and rash  Neurological: Negative for seizures and syncope  All other systems reviewed and are negative  Historical Information   History reviewed  No pertinent past medical history  History reviewed  No pertinent surgical history    Social History   Social History     Substance and Sexual Activity   Alcohol Use Yes    Comment: ocass Social History     Substance and Sexual Activity   Drug Use Never     Social History     Tobacco Use   Smoking Status Current Every Day Smoker   Smokeless Tobacco Never Used     Family History: History reviewed  No pertinent family history  Meds/Allergies   all medications and allergies reviewed  Allergies   Allergen Reactions    Penicillins GI Intolerance       Objective   First Vitals:   Blood Pressure: (!) 181/79 (08/19/22 2313)  Pulse: 76 (08/19/22 2313)  Temperature: 97 8 °F (36 6 °C) (08/19/22 2313)  Temp Source: Oral (08/19/22 2313)  Respirations: 16 (08/19/22 2313)  Weight - Scale: 93 3 kg (205 lb 11 oz) (08/19/22 2315)  SpO2: 99 % (08/19/22 2313)    Current Vitals:   Blood Pressure: 160/87 (08/20/22 0647)  Pulse: 71 (08/20/22 0647)  Temperature: 97 8 °F (36 6 °C) (08/19/22 2313)  Temp Source: Oral (08/19/22 2313)  Respirations: 16 (08/20/22 0647)  Weight - Scale: 93 3 kg (205 lb 11 oz) (08/19/22 2315)  SpO2: 97 % (08/20/22 0647)      Intake/Output Summary (Last 24 hours) at 8/20/2022 0754  Last data filed at 8/20/2022 0054  Gross per 24 hour   Intake 1000 ml   Output --   Net 1000 ml       Invasive Devices  Report    Peripheral Intravenous Line  Duration           Peripheral IV 08/19/22 Left Antecubital <1 day                Physical Exam  Constitutional:       General: She is not in acute distress  Appearance: She is not ill-appearing or toxic-appearing  HENT:      Head: Normocephalic  Right Ear: External ear normal       Left Ear: External ear normal       Nose: Nose normal       Mouth/Throat:      Mouth: Mucous membranes are moist    Eyes:      Pupils: Pupils are equal, round, and reactive to light  Cardiovascular:      Rate and Rhythm: Normal rate and regular rhythm  Pulses: Normal pulses  Pulmonary:      Effort: Pulmonary effort is normal  No respiratory distress  Breath sounds: No stridor  Abdominal:      General: Abdomen is flat  There is no distension  Tenderness: There is no abdominal tenderness  There is no guarding or rebound  Musculoskeletal:         General: No swelling or tenderness  Normal range of motion  Cervical back: Normal range of motion  No rigidity or tenderness  Skin:     General: Skin is warm and dry  Neurological:      General: No focal deficit present  Mental Status: She is alert and oriented to person, place, and time  Psychiatric:         Mood and Affect: Mood normal            Lab Results: I have personally reviewed pertinent lab results  Imaging: I have personally reviewed pertinent reports  EKG, Pathology, and Other Studies: I have personally reviewed pertinent reports        Code Status: No Order  Advance Directive and Living Will:      Power of :    POLST:

## 2022-08-20 NOTE — PLAN OF CARE
Problem: GASTROINTESTINAL - ADULT  Goal: Maintains or returns to baseline bowel function  Description: INTERVENTIONS:  - Assess bowel function  - Encourage oral fluids to ensure adequate hydration  - Administer IV fluids if ordered to ensure adequate hydration  - Administer ordered medications as needed  - Encourage mobilization and activity  - Consider nutritional services referral to assist patient with adequate nutrition and appropriate food choices  Outcome: Progressing     Problem: PAIN - ADULT  Goal: Verbalizes/displays adequate comfort level or baseline comfort level  Description: Interventions:  - Encourage patient to monitor pain and request assistance  - Assess pain using appropriate pain scale  - Administer analgesics based on type and severity of pain and evaluate response  - Implement non-pharmacological measures as appropriate and evaluate response  - Consider cultural and social influences on pain and pain management  - Notify physician/advanced practitioner if interventions unsuccessful or patient reports new pain  Outcome: Progressing     Problem: INFECTION - ADULT  Goal: Absence or prevention of progression during hospitalization  Description: INTERVENTIONS:  - Assess and monitor for signs and symptoms of infection  - Monitor lab/diagnostic results  - Monitor all insertion sites, i e  indwelling lines, tubes, and drains  - Monitor endotracheal if appropriate and nasal secretions for changes in amount and color  - [place] appropriate cooling/warming therapies per order  - Administer medications as ordered  - Instruct and encourage patient and family to use good hand hygiene technique  - Identify and instruct in appropriate isolation precautions for identified infection/condition  Outcome: Progressing  Goal: Absence of fever/infection during neutropenic period  Description: INTERVENTIONS:  - Monitor WBC    Outcome: Progressing     Problem: SAFETY ADULT  Goal: Patient will remain free of falls  Description: INTERVENTIONS:  - Educate patient/family on patient safety including physical limitations  - Instruct patient to call for assistance with activity   - Consult OT/PT to assist with strengthening/mobility   - Keep Call bell within reach  - Keep bed low and locked with side rails adjusted as appropriate  - Keep care items and personal belongings within reach  - Initiate and maintain comfort rounds  - Make Fall Risk Sign visible to staff  - Offer Toileting every  Hours, in advance of need  - Initiate/Maintain alarm  - Obtain necessary fall risk management equipment:   - Apply yellow socks and bracelet for high fall risk patients  - Consider moving patient to room near nurses station  Outcome: Progressing  Goal: Maintain or return to baseline ADL function  Description: INTERVENTIONS:  -  Assess patient's ability to carry out ADLs; assess patient's baseline for ADL function and identify physical deficits which impact ability to perform ADLs (bathing, care of mouth/teeth, toileting, grooming, dressing, etc )  - Assess/evaluate cause of self-care deficits   - Assess range of motion  - Assess patient's mobility; develop plan if impaired  - Assess patient's need for assistive devices and provide as appropriate  - Encourage maximum independence but intervene and supervise when necessary  - Involve family in performance of ADLs  - Assess for home care needs following discharge   - Consider OT consult to assist with ADL evaluation and planning for discharge  - Provide patient education as appropriate  Outcome: Progressing  Goal: Maintains/Returns to pre admission functional level  Description: INTERVENTIONS:  - Perform BMAT or MOVE assessment daily    - Set and communicate daily mobility goal to care team and patient/family/caregiver  - Collaborate with rehabilitation services on mobility goals if consulted  - Perform Range of Motion  times a day  - Reposition patient every  hours    - Dangle patient  times a day  - Stand patient  times a day  - Ambulate patient  times a day  - Out of bed to chair  times a day   - Out of bed for meals times a day  - Out of bed for toileting  - Record patient progress and toleration of activity level   Outcome: Progressing     Problem: DISCHARGE PLANNING  Goal: Discharge to home or other facility with appropriate resources  Description: INTERVENTIONS:  - Identify barriers to discharge w/patient and caregiver  - Arrange for needed discharge resources and transportation as appropriate  - Identify discharge learning needs (meds, wound care, etc )  - Arrange for interpretive services to assist at discharge as needed  - Refer to Case Management Department for coordinating discharge planning if the patient needs post-hospital services based on physician/advanced practitioner order or complex needs related to functional status, cognitive ability, or social support system  Outcome: Progressing     Problem: Knowledge Deficit  Goal: Patient/family/caregiver demonstrates understanding of disease process, treatment plan, medications, and discharge instructions  Description: Complete learning assessment and assess knowledge base    Interventions:  - Provide teaching at level of understanding  - Provide teaching via preferred learning methods  Outcome: Progressing

## 2022-08-21 VITALS
BODY MASS INDEX: 34.23 KG/M2 | WEIGHT: 205.69 LBS | OXYGEN SATURATION: 95 % | DIASTOLIC BLOOD PRESSURE: 78 MMHG | TEMPERATURE: 98.2 F | HEART RATE: 69 BPM | SYSTOLIC BLOOD PRESSURE: 132 MMHG | RESPIRATION RATE: 18 BRPM

## 2022-08-21 LAB
ANION GAP SERPL CALCULATED.3IONS-SCNC: 5 MMOL/L (ref 4–13)
BASOPHILS # BLD AUTO: 0.04 THOUSANDS/ΜL (ref 0–0.1)
BASOPHILS NFR BLD AUTO: 1 % (ref 0–1)
BUN SERPL-MCNC: 8 MG/DL (ref 5–25)
CALCIUM SERPL-MCNC: 8.4 MG/DL (ref 8.3–10.1)
CHLORIDE SERPL-SCNC: 108 MMOL/L (ref 96–108)
CO2 SERPL-SCNC: 28 MMOL/L (ref 21–32)
CREAT SERPL-MCNC: 1.07 MG/DL (ref 0.6–1.3)
EOSINOPHIL # BLD AUTO: 0.19 THOUSAND/ΜL (ref 0–0.61)
EOSINOPHIL NFR BLD AUTO: 3 % (ref 0–6)
ERYTHROCYTE [DISTWIDTH] IN BLOOD BY AUTOMATED COUNT: 12.9 % (ref 11.6–15.1)
GFR SERPL CREATININE-BSD FRML MDRD: 61 ML/MIN/1.73SQ M
GLUCOSE SERPL-MCNC: 99 MG/DL (ref 65–140)
HCT VFR BLD AUTO: 40.9 % (ref 34.8–46.1)
HGB BLD-MCNC: 13.7 G/DL (ref 11.5–15.4)
IMM GRANULOCYTES # BLD AUTO: 0.02 THOUSAND/UL (ref 0–0.2)
IMM GRANULOCYTES NFR BLD AUTO: 0 % (ref 0–2)
LYMPHOCYTES # BLD AUTO: 2.4 THOUSANDS/ΜL (ref 0.6–4.47)
LYMPHOCYTES NFR BLD AUTO: 40 % (ref 14–44)
MCH RBC QN AUTO: 28.8 PG (ref 26.8–34.3)
MCHC RBC AUTO-ENTMCNC: 33.5 G/DL (ref 31.4–37.4)
MCV RBC AUTO: 86 FL (ref 82–98)
MONOCYTES # BLD AUTO: 0.44 THOUSAND/ΜL (ref 0.17–1.22)
MONOCYTES NFR BLD AUTO: 7 % (ref 4–12)
NEUTROPHILS # BLD AUTO: 2.96 THOUSANDS/ΜL (ref 1.85–7.62)
NEUTS SEG NFR BLD AUTO: 49 % (ref 43–75)
NRBC BLD AUTO-RTO: 0 /100 WBCS
PLATELET # BLD AUTO: 218 THOUSANDS/UL (ref 149–390)
PMV BLD AUTO: 9.7 FL (ref 8.9–12.7)
POTASSIUM SERPL-SCNC: 4.3 MMOL/L (ref 3.5–5.3)
RBC # BLD AUTO: 4.75 MILLION/UL (ref 3.81–5.12)
SODIUM SERPL-SCNC: 141 MMOL/L (ref 135–147)
WBC # BLD AUTO: 6.05 THOUSAND/UL (ref 4.31–10.16)

## 2022-08-21 PROCEDURE — 85025 COMPLETE CBC W/AUTO DIFF WBC: CPT | Performed by: INTERNAL MEDICINE

## 2022-08-21 PROCEDURE — 99217 PR OBSERVATION CARE DISCHARGE MANAGEMENT: CPT | Performed by: INTERNAL MEDICINE

## 2022-08-21 PROCEDURE — 80048 BASIC METABOLIC PNL TOTAL CA: CPT | Performed by: INTERNAL MEDICINE

## 2022-08-21 RX ORDER — ONDANSETRON 4 MG/1
4 TABLET, ORALLY DISINTEGRATING ORAL EVERY 6 HOURS PRN
Qty: 10 TABLET | Refills: 0 | Status: SHIPPED | OUTPATIENT
Start: 2022-08-21

## 2022-08-21 RX ADMIN — ESCITALOPRAM OXALATE 20 MG: 20 TABLET, FILM COATED ORAL at 08:24

## 2022-08-21 RX ADMIN — BUPROPION HYDROCHLORIDE 300 MG: 150 TABLET, FILM COATED, EXTENDED RELEASE ORAL at 08:24

## 2022-08-21 RX ADMIN — SODIUM CHLORIDE AND POTASSIUM CHLORIDE 125 ML/HR: .9; .15 SOLUTION INTRAVENOUS at 05:09

## 2022-08-21 NOTE — PLAN OF CARE
Problem: GASTROINTESTINAL - ADULT  Goal: Maintains or returns to baseline bowel function  Description: INTERVENTIONS:  - Assess bowel function  - Encourage oral fluids to ensure adequate hydration  - Administer IV fluids if ordered to ensure adequate hydration  - Administer ordered medications as needed  - Encourage mobilization and activity  - Consider nutritional services referral to assist patient with adequate nutrition and appropriate food choices  Outcome: Progressing     Problem: PAIN - ADULT  Goal: Verbalizes/displays adequate comfort level or baseline comfort level  Description: Interventions:  - Encourage patient to monitor pain and request assistance  - Assess pain using appropriate pain scale  - Administer analgesics based on type and severity of pain and evaluate response  - Implement non-pharmacological measures as appropriate and evaluate response  - Consider cultural and social influences on pain and pain management  - Notify physician/advanced practitioner if interventions unsuccessful or patient reports new pain  Outcome: Progressing     Problem: INFECTION - ADULT  Goal: Absence or prevention of progression during hospitalization  Description: INTERVENTIONS:  - Assess and monitor for signs and symptoms of infection  - Monitor lab/diagnostic results  - Monitor all insertion sites, i e  indwelling lines, tubes, and drains  - Monitor endotracheal if appropriate and nasal secretions for changes in amount and color  - [place] appropriate cooling/warming therapies per order  - Administer medications as ordered  - Instruct and encourage patient and family to use good hand hygiene technique  - Identify and instruct in appropriate isolation precautions for identified infection/condition  Outcome: Progressing  Goal: Absence of fever/infection during neutropenic period  Description: INTERVENTIONS:  - Monitor WBC    Outcome: Progressing     Problem: SAFETY ADULT  Goal: Patient will remain free of falls  Description: INTERVENTIONS:  - Educate patient/family on patient safety including physical limitations  - Instruct patient to call for assistance with activity   - Consult OT/PT to assist with strengthening/mobility   - Keep Call bell within reach  - Keep bed low and locked with side rails adjusted as appropriate  - Keep care items and personal belongings within reach  - Initiate and maintain comfort rounds  - Make Fall Risk Sign visible to staff  - Offer Toileting every 2 Hours, in advance of need  - Apply yellow socks and bracelet for high fall risk patients  - Consider moving patient to room near nurses station  Outcome: Progressing  Goal: Maintain or return to baseline ADL function  Description: INTERVENTIONS:  -  Assess patient's ability to carry out ADLs; assess patient's baseline for ADL function and identify physical deficits which impact ability to perform ADLs (bathing, care of mouth/teeth, toileting, grooming, dressing, etc )  - Assess/evaluate cause of self-care deficits   - Assess range of motion  - Assess patient's mobility; develop plan if impaired  - Assess patient's need for assistive devices and provide as appropriate  - Encourage maximum independence but intervene and supervise when necessary  - Involve family in performance of ADLs  - Assess for home care needs following discharge   - Consider OT consult to assist with ADL evaluation and planning for discharge  - Provide patient education as appropriate  Outcome: Progressing  Goal: Maintains/Returns to pre admission functional level  Description: INTERVENTIONS:  - Perform BMAT or MOVE assessment daily    - Set and communicate daily mobility goal to care team and patient/family/caregiver  - Collaborate with rehabilitation services on mobility goals if consulted  - Perform Range of Motion 3 times a day  - Reposition patient every 2 hours    - Dangle patient 3 times a day  - Stand patient 3 times a day  - Ambulate patient 3 times a day  - Out of bed to chair 3 times a day   - Out of bed for meals 3 times a day  - Out of bed for toileting  - Record patient progress and toleration of activity level   Outcome: Progressing     Problem: DISCHARGE PLANNING  Goal: Discharge to home or other facility with appropriate resources  Description: INTERVENTIONS:  - Identify barriers to discharge w/patient and caregiver  - Arrange for needed discharge resources and transportation as appropriate  - Identify discharge learning needs (meds, wound care, etc )  - Arrange for interpretive services to assist at discharge as needed  - Refer to Case Management Department for coordinating discharge planning if the patient needs post-hospital services based on physician/advanced practitioner order or complex needs related to functional status, cognitive ability, or social support system  Outcome: Progressing     Problem: Knowledge Deficit  Goal: Patient/family/caregiver demonstrates understanding of disease process, treatment plan, medications, and discharge instructions  Description: Complete learning assessment and assess knowledge base    Interventions:  - Provide teaching at level of understanding  - Provide teaching via preferred learning methods  Outcome: Progressing

## 2022-08-21 NOTE — PLAN OF CARE
Problem: GASTROINTESTINAL - ADULT  Goal: Maintains or returns to baseline bowel function  Description: INTERVENTIONS:  - Assess bowel function  - Encourage oral fluids to ensure adequate hydration  - Administer IV fluids if ordered to ensure adequate hydration  - Administer ordered medications as needed  - Encourage mobilization and activity  - Consider nutritional services referral to assist patient with adequate nutrition and appropriate food choices  Outcome: Progressing     Problem: PAIN - ADULT  Goal: Verbalizes/displays adequate comfort level or baseline comfort level  Description: Interventions:  - Encourage patient to monitor pain and request assistance  - Assess pain using appropriate pain scale  - Administer analgesics based on type and severity of pain and evaluate response  - Implement non-pharmacological measures as appropriate and evaluate response  - Consider cultural and social influences on pain and pain management  - Notify physician/advanced practitioner if interventions unsuccessful or patient reports new pain  Outcome: Progressing     Problem: INFECTION - ADULT  Goal: Absence or prevention of progression during hospitalization  Description: INTERVENTIONS:  - Assess and monitor for signs and symptoms of infection  - Monitor lab/diagnostic results  - Monitor all insertion sites, i e  indwelling lines, tubes, and drains  - Monitor endotracheal if appropriate and nasal secretions for changes in amount and color  - [place] appropriate cooling/warming therapies per order  - Administer medications as ordered  - Instruct and encourage patient and family to use good hand hygiene technique  - Identify and instruct in appropriate isolation precautions for identified infection/condition  Outcome: Progressing  Goal: Absence of fever/infection during neutropenic period  Description: INTERVENTIONS:  - Monitor WBC    Outcome: Progressing     Problem: SAFETY ADULT  Goal: Patient will remain free of falls  Description: INTERVENTIONS:  - Educate patient/family on patient safety including physical limitations  - Instruct patient to call for assistance with activity   - Consult OT/PT to assist with strengthening/mobility   - Keep Call bell within reach  - Keep bed low and locked with side rails adjusted as appropriate  - Keep care items and personal belongings within reach  - Initiate and maintain comfort rounds  - Make Fall Risk Sign visible to staff  - Offer Toileting every  Hours, in advance of need  - Initiate/Maintain alarm  - Obtain necessary fall risk management equipment:   - Apply yellow socks and bracelet for high fall risk patients  - Consider moving patient to room near nurses station  Outcome: Progressing  Goal: Maintain or return to baseline ADL function  Description: INTERVENTIONS:  -  Assess patient's ability to carry out ADLs; assess patient's baseline for ADL function and identify physical deficits which impact ability to perform ADLs (bathing, care of mouth/teeth, toileting, grooming, dressing, etc )  - Assess/evaluate cause of self-care deficits   - Assess range of motion  - Assess patient's mobility; develop plan if impaired  - Assess patient's need for assistive devices and provide as appropriate  - Encourage maximum independence but intervene and supervise when necessary  - Involve family in performance of ADLs  - Assess for home care needs following discharge   - Consider OT consult to assist with ADL evaluation and planning for discharge  - Provide patient education as appropriate  Outcome: Progressing  Goal: Maintains/Returns to pre admission functional level  Description: INTERVENTIONS:  - Perform BMAT or MOVE assessment daily    - Set and communicate daily mobility goal to care team and patient/family/caregiver  - Collaborate with rehabilitation services on mobility goals if consulted  - Perform Range of Motion times a day  - Reposition patient every  hours    - Dangle patient  times a day  - Stand patient  times a day  - Ambulate patient  times a day  - Out of bed to chair  times a day   - Out of bed for meals  times a day  - Out of bed for toileting  - Record patient progress and toleration of activity level   Outcome: Progressing     Problem: DISCHARGE PLANNING  Goal: Discharge to home or other facility with appropriate resources  Description: INTERVENTIONS:  - Identify barriers to discharge w/patient and caregiver  - Arrange for needed discharge resources and transportation as appropriate  - Identify discharge learning needs (meds, wound care, etc )  - Arrange for interpretive services to assist at discharge as needed  - Refer to Case Management Department for coordinating discharge planning if the patient needs post-hospital services based on physician/advanced practitioner order or complex needs related to functional status, cognitive ability, or social support system  Outcome: Progressing     Problem: Knowledge Deficit  Goal: Patient/family/caregiver demonstrates understanding of disease process, treatment plan, medications, and discharge instructions  Description: Complete learning assessment and assess knowledge base    Interventions:  - Provide teaching at level of understanding  - Provide teaching via preferred learning methods  Outcome: Progressing

## 2022-08-21 NOTE — DISCHARGE SUMMARY
Discharge Summary - Buster Guidry, 1970, [id number]        Admission Date: 8/20/2022  Discharge Date: 8/21/2022      Discharge Diagnosis:   1  Ileus, most likely viral syndrome  2  Dehydration  3  Depression  4  Smoking       Consulting Physicians:  None    Procedures Performed:   None    HPI:  The patient is a 31-year-old woman who was in her usual state of excellent health until the evening before admission  At that time she developed some crampy abdominal pain  This became more severe  She developed some vomiting  She went to the emergency room for evaluation  Initial CT scan suggested small bowel obstruction  Subsequent study was more consistent with ileus  She was evaluated by surgery in the emergency room and they did not think that any surgical intervention was required  Therefore, admission by Internal Medicine was requested  Hospital Course: The patient was admitted to the hospital on observation status  She was hydrated with intravenous fluid and kept on clear liquids  She had no further vomiting  She was able to tolerate liquids  Diet was thereafter increased  She did feel somewhat bloated after she ate her 1st full meal but had no vomiting or significant pain  She was therefore thought to be stable enough for further outpatient management  The patient was somewhat dehydrated at the time of admission  She was rehydrated with intravenous fluid with good affect  The patient smokes half pack of cigarettes per day  I did  her regarding the advisability of stopping  She does not seem ready to try this at the moment  She did not desire a nicotine patch  At the time of discharge the patient was feeling well  Vital signs were stable  Lungs were clear  Cardiac exam revealed regular rhythm  The abdomen was soft and nontender  There was no edema  Disposition:  The patient was discharged home on August 21st   Diet and activity will be as tolerated    She was asked to arrange follow-up with her primary care physician within 1 week  Discharge instructions/Information to patient and family:   See after visit summary for information provided to patient and family  Provisions for Follow-Up Care:  See after visit summary for information related to follow-up care and any pertinent home health orders  Planned Readmission: No    Discharge Statement   I spent 30 minutes discharging the patient  This time was spent on the day of discharge  I had direct contact with the patient on the day of discharge  Discharge Medications:  See after visit summary for reconciled discharge medications provided to patient and family

## 2022-08-21 NOTE — UTILIZATION REVIEW
Initial Clinical Review    Admission: Date/Time/Statement:   Admission Orders (From admission, onward)     Ordered        08/20/22 0827  Place in Observation  Once                      Orders Placed This Encounter   Procedures    Place in Observation     Standing Status:   Standing     Number of Occurrences:   1     Order Specific Question:   Level of Care     Answer:   Med Surg [16]     ED Arrival Information     Expected   -    Arrival   8/19/2022 23:06    Acuity   Urgent            Means of arrival   Walk-In    Escorted by   Self    Service   Hospitalist    Admission type   Urgent            Arrival complaint   abdominal pain           Chief Complaint   Patient presents with    Abdominal Pain     Pt c/o abd pain since 530pm, reports pain has been getting worse, also reports nausea  Initial Presentation: 54 y o  female with PMH of depression, obesity presented to the ED from home with acute onset of periumbilical pain, nausea and 1 episode of vomiting last night  Pt reports pain as crampy  In the ED, CT scan showed possibility of SBO  She was seen by gen surg w/ request for additional imaging  On exam, abdomen soft, bowel sounds hypoactive  Mild diffuse tenderness       Admit as observation level of care for abdomina; pain vomiting, probable ileus, dehydration: IVF, pain and nausea control prn,           ED Triage Vitals   Temperature Pulse Respirations Blood Pressure SpO2   08/19/22 2313 08/19/22 2313 08/19/22 2313 08/19/22 2313 08/19/22 2313   97 8 °F (36 6 °C) 76 16 (!) 181/79 99 %      Temp Source Heart Rate Source Patient Position - Orthostatic VS BP Location FiO2 (%)   08/19/22 2313 08/19/22 2313 08/19/22 2313 08/19/22 2313 --   Oral Monitor Sitting Right arm       Pain Score       08/19/22 2315       10 - Worst Possible Pain          Wt Readings from Last 1 Encounters:   08/19/22 93 3 kg (205 lb 11 oz)     Additional Vital Signs:     Pertinent Labs/Diagnostic Test Results:   CT abdomen pelvis wo contrast   Final Result by Stephie Decker MD (83/39 [id number])      Fairly long segment of mildly but disproportionately distended jejunum in the left mid abdomen is of uncertain etiology  Even though enteric contrast has not passed into the more collapsed distal small bowel, in this patient with no history of prior    abdominal surgery the appearance remains more suspicious for localized ileus than for small bowel obstruction  If appropriate to the clinical scenario, continued interval radiographic follow-up to observe the progression of ingested enteric contrast    would likely prove useful  Hepatic steatosis  Workstation performed: [id number]         CT abdomen pelvis with contrast   Final Result by Cristina Sharpe MD (08/20 0234)      Multiple dilated fluid-filled small bowel loops with transition point in the left upper quadrant compatible with small bowel obstruction  The study was marked in [facility] for immediate notification              Workstation performed: [id number]               Results from last 7 days   Lab Units 08/21/22 0447 08/19/22  2349   WBC Thousand/uL 6 05 11 08*   HEMOGLOBIN g/dL 13 7 15 5*   HEMATOCRIT % 40 9 44 5   PLATELETS Thousands/uL 218 255   NEUTROS ABS Thousands/µL 2 96 6 38         Results from last 7 days   Lab Units 08/21/22  0447 08/19/22  2349   SODIUM mmol/L 141 139   POTASSIUM mmol/L 4 3 3 8   CHLORIDE mmol/L 108 103   CO2 mmol/L 28 27   ANION GAP mmol/L 5 9   BUN mg/dL 8 13   CREATININE mg/dL 1 07 1 14   EGFR ml/min/1 73sq m 61 56   CALCIUM mg/dL 8 4 9 2     Results from last 7 days   Lab Units 08/19/22  2349   AST U/L 30   ALT U/L 64   ALK PHOS U/L 60   TOTAL PROTEIN g/dL 8 4   ALBUMIN g/dL 3 9   TOTAL BILIRUBIN mg/dL 0 40         Results from last 7 days   Lab Units 08/21/22  0447 08/19/22  2349   GLUCOSE RANDOM mg/dL 99 118             No results found for: BETA-HYDROXYBUTYRATE                           Results from last 7 days   Lab Units 08/19/22  2349 PROTIME seconds 12 7   INR  0 95   PTT seconds 22*             Results from last 7 days   Lab Units 08/20/22  0313   LACTIC ACID mmol/L 0 9                         Results from last 7 days   Lab Units 08/19/22  2349   LIPASE u/L 112                 Results from last 7 days   Lab Units 08/19/22  2349 08/19/22  2347   CLARITY UA  clear Clear   COLOR UA  yellow/straw Yellow   SPEC GRAV UA   --  1 020   SPEC GRAV US  1 020  --    PH UA  6 0 6 0   GLUCOSE UA  negative Negative   KETONES UA  negative Negative   BLOOD UA  negative Negative   PROTEIN UA  negative Negative   NITRITE UA  negative Negative   BILIRUBIN UA   --  Negative   BILIRUBIN, UA  negative  --    UROBILINOGEN UA E U /dl  --  0 2   LEUKOCYTES UA  negative Negative                                               ED Treatment:   Medication Administration from 08/19/2022 2306 to 08/20/2022 1873       Date/Time Order Dose Route Action Action by Comments     08/20/2022 0054 sodium chloride 0 9 % bolus 1,000 mL 0 mL Intravenous Stopped Israel Murray RN      08/19/2022 2349 sodium chloride 0 9 % bolus 1,000 mL 1,000 mL Intravenous New Bag Emil Gilford, RN      08/19/2022 2349 ondansetron (ZOFRAN) injection 4 mg 4 mg Intravenous Given Emil Gilford, RN      08/19/2022 2358 morphine injection 6 mg 6 mg Intravenous Given Israel Murray RN      08/20/2022 0038 iohexol (OMNIPAQUE) 350 MG/ML injection (SINGLE-DOSE) 60 mL 60 mL Intravenous Given Miriam Munoz      08/20/2022 0525 ondansetron (ZOFRAN) injection 4 mg 4 mg Intravenous Given Israel Murray RN      08/20/2022 0525 morphine injection 4 mg 4 mg Intravenous Given Israel Murray RN      08/20/2022 0558 diatrizoate meglumine-sodium (GASTROGRAFIN) solution 30 mL 30 mL Oral Given Miriam Munoz         History reviewed  No pertinent past medical history    Present on Admission:   Ileus ([address] )   Dehydration   Dysthymia      Admitting Diagnosis: Ileus ([facility] [address] ) [K56 7]  Abdominal pain [R10 9]  Generalized abdominal pain [R10 84]  Age/Sex: 54 y o  female  Admission Orders:  Scheduled Medications:  buPROPion, 300 mg, Oral, QAM  escitalopram, 20 mg, Oral, Daily      Continuous IV Infusions:  sodium chloride 0 9 % with KCl 20 mEq/L, 125 mL/hr, Intravenous, Continuous      PRN Meds:  acetaminophen, 650 mg, Oral, Q4H PRN  ondansetron, 4 mg, Intravenous, Q4H PRN        None    Network Utilization Review Department  ATTENTION: Please call with any questions or concerns to [phone] and carefully listen to the prompts so that you are directed to the right person  All voicemails are confidential   Areli Godoy all requests for admission clinical reviews, approved or denied determinations and any other requests to dedicated fax number below belonging to the campus where the patient is receiving treatment   List of dedicated fax numbers for the Facilities:  [address] DENIALS (Administrative/Medical Necessity) [phone]   [address] (Maternity/NICU/Pediatrics) [phone]   [address] [place] [address] [address] [place] [address]  [address] [address] [place] [address] Radha Prashant [address] [facility] [facility] [phone]

## 2022-10-19 PROBLEM — E86.0 DEHYDRATION: Status: RESOLVED | Noted: 2022-08-20 | Resolved: 2022-10-19
